# Patient Record
Sex: FEMALE
[De-identification: names, ages, dates, MRNs, and addresses within clinical notes are randomized per-mention and may not be internally consistent; named-entity substitution may affect disease eponyms.]

---

## 2017-07-19 ENCOUNTER — HOSPITAL ENCOUNTER (INPATIENT)
Dept: HOSPITAL 31 - C.EROB | Age: 27
LOS: 2 days | Discharge: HOME | DRG: 373 | End: 2017-07-21
Attending: OBSTETRICS & GYNECOLOGY | Admitting: OBSTETRICS & GYNECOLOGY
Payer: COMMERCIAL

## 2017-07-19 VITALS — BODY MASS INDEX: 24.2 KG/M2

## 2017-07-19 DIAGNOSIS — Z3A.38: ICD-10-CM

## 2017-07-19 LAB
ALBUMIN SERPL-MCNC: 3.2 G/DL (ref 3.5–5)
ALBUMIN/GLOB SERPL: 1 {RATIO} (ref 1–2.1)
ALT SERPL-CCNC: 27 U/L (ref 9–52)
AST SERPL-CCNC: 30 U/L (ref 14–36)
BASOPHILS # BLD AUTO: 0 K/UL (ref 0–0.2)
BASOPHILS NFR BLD: 0.4 % (ref 0–2)
BILIRUB UR-MCNC: NEGATIVE MG/DL
BUN SERPL-MCNC: 11 MG/DL (ref 7–17)
CALCIUM SERPL-MCNC: 8.8 MG/DL (ref 8.6–10.4)
EOSINOPHIL # BLD AUTO: 0.3 K/UL (ref 0–0.7)
EOSINOPHIL NFR BLD: 2.4 % (ref 0–4)
ERYTHROCYTE [DISTWIDTH] IN BLOOD BY AUTOMATED COUNT: 15.4 % (ref 11.5–14.5)
GFR NON-AFRICAN AMERICAN: > 60
GLUCOSE UR STRIP-MCNC: NORMAL MG/DL
HGB BLD-MCNC: 11.2 G/DL (ref 11–16)
LEUKOCYTE ESTERASE UR-ACNC: (no result) LEU/UL
LYMPHOCYTES # BLD AUTO: 3.2 K/UL (ref 1–4.3)
LYMPHOCYTES NFR BLD AUTO: 27.3 % (ref 20–40)
MCH RBC QN AUTO: 27.4 PG (ref 27–31)
MCHC RBC AUTO-ENTMCNC: 32.4 G/DL (ref 33–37)
MCV RBC AUTO: 84.6 FL (ref 81–99)
MONOCYTES # BLD: 0.9 K/UL (ref 0–0.8)
MONOCYTES NFR BLD: 7.5 % (ref 0–10)
NEUTROPHILS # BLD: 7.3 K/UL (ref 1.8–7)
NEUTROPHILS NFR BLD AUTO: 62.4 % (ref 50–75)
NRBC BLD AUTO-RTO: 0.1 % (ref 0–2)
PH UR STRIP: 7 [PH] (ref 5–8)
PLATELET # BLD: 263 K/UL (ref 130–400)
PMV BLD AUTO: 9.5 FL (ref 7.2–11.7)
PROT UR STRIP-MCNC: NEGATIVE MG/DL
RBC # BLD AUTO: 4.1 MIL/UL (ref 3.8–5.2)
RBC # UR STRIP: NEGATIVE /UL
SP GR UR STRIP: 1 (ref 1–1.03)
SQUAMOUS EPITHIAL: 1 /HPF (ref 0–5)
URINE NITRATE: NEGATIVE
UROBILINOGEN UR-MCNC: NORMAL MG/DL (ref 0.2–1)
WBC # BLD AUTO: 11.6 K/UL (ref 4.8–10.8)

## 2017-07-19 PROCEDURE — 0KQM0ZZ REPAIR PERINEUM MUSCLE, OPEN APPROACH: ICD-10-PCS | Performed by: OBSTETRICS & GYNECOLOGY

## 2017-07-19 RX ADMIN — Medication SCH TAB: at 10:16

## 2017-07-19 RX ADMIN — ACETAMINOPHEN AND CODEINE PHOSPHATE PRN EA: 300; 30 TABLET ORAL at 08:43

## 2017-07-19 RX ADMIN — ACETAMINOPHEN AND CODEINE PHOSPHATE PRN EA: 300; 30 TABLET ORAL at 14:38

## 2017-07-19 NOTE — OBDS
===================================

DELIVERY PERSONNEL

===================================

   

Delivery Doctor:  JUANA Bar MD

Circulator:  CORNELIO Rodríguez

   

===================================

MATERNAL INFORMATION

===================================

   

Delivery Anesthesia:  None

Medications in Delivery:  pitocin 20 units, methergine 0.2 mg

Estimated  Blood Loss (ml):  300

Placenta Cultured:  No

Maternal Complications:  None

Provider Comments:  Patient rapidly progressed to fully.Delivered a a viable male infant from LEIGH wit
h spontaneous delivery of the body.Cord clamped and cut.Cord blood collected.Baby taken to the warmer
.placenta spontaneously delivered.Uterus noted to be boggy.Im metehrgine 0.2 mg im given after which 
the uterus was noted to be firm.second degree perineal laceration and right and left labial laceratio
n repaired with 2-0 chromic.Fundus firm.patient stable

   

===================================

LABOR SUMMARY

===================================

   

EDC:  2017 00:00

No. Babies in Womb:  1

 Attempted:  No

Labor Anesthesia:  None

   

===================================

LABOR INFORMATION

===================================

   

Reason for Induction:  Not Applicable

Onset of Labor:  2017 18:00

Complete Dilatation:  2017 02:13

Other Ripening Agents:  n/a

Oxytocin:  N/A

Group B Beta Strep:  Negative

Antibiotics # of Doses:  0

Steroids Given:  None

Reason Steroids Not Administered:  Not Applicable

   

===================================

MEMBRANES

===================================

   

Membranes Rupture Method:  Spontaneous

Rupture of Membranes:  2017 02:14

Length of Rupture (hrs):  18.02

Amniotic Fluid Color:  Light Meconium

Amniotic Fluid Amount:  Moderate

Amniotic Fluid Odor:  Normal

   

===================================

STAGES OF LABOR

===================================

   

Stage 1 hrs:  8

Stage 1 min:  13

Stage 2 hrs:  18

Stage 2 min:  2

Stage 3 hrs:  -17

Stage 3 min:  -52

Total Time in Labor hrs:  8

Total Time in Labor min:  23

   

===================================

VAGINAL DELIVERY

===================================

   

Episiotomy:  None

Laceration Extension:  Second Degree

Laceration Type:  Perineal

Other Laceration:  right and left labial

Laceration Repair:  Yes

Laceration Repair Note:  second degree perineal laceration and right and left labial laceration repai
red with 2-0 chromic

Sponge Count Correct:  Yes; Vaginal Sweep Performed

Sharps Count Correct:  Yes

   

===================================

BABY A INFORMATION

===================================

   

Infant Delivery Date/Time:  2017 20:15

Method of Delivery:  Vaginal

Born in Route :  No

:  N/A

Forceps:  N/A

Vacuum Extraction:  N/A

Shoulder Dystocia :  No

   

===================================

SHOULDER DYSTOCIA BABY A

===================================

   

Infant Delivery Date/Time:  2017 20:15

   

===================================

PRESENTATION/POSITION BABY A

===================================

   

Presentation:  Cephalic

Cephalic Presentation:  Vertex

Vertex Position:  Right Occipital Anterior

Breech Presentation:  N/A

   

===================================

PLACENTA INFORMATION BABY A

===================================

   

Placenta Delivery Time :  2017 02:23

Placenta Method of Delivery:  Spontaneous

Placenta Status:  Delivered

   

===================================

APGAR SCORES BABY A

===================================

   

Heart Rate 1 min:  >100 bpm

Resp Effort 1 min:  Good Cry

Reflex Irritability 1 min:  Cough or Sneeze or Pulls Away

Muscle Tone 1 min:  Active Motion

Color 1 min:  Body Pink, Extremities Blue

APGAR SCORE 1 MIN:  9

Heart Rate 5 min:  >100 bpm

Resp Effort 5 min:  Good Cry

Reflex Irritability 5 min:  Cough or Sneeze or Pulls Away

Muscle Tone 5 min:  Active Motion

Color 5 min:  Body Pink, Extremities Blue

APGAR SCORE 5 MIN:  9

   

===================================

INFANT INFORMATION BABY A

===================================

   

Gestational Age at Delivery:  38.1

Gestational Status:  Term

Infant Outcome :  Liveborn

Infant Condition :  Stable

Infant Sex:  Male

   

===================================

IDENTIFICATION/MEDS BABY A

===================================

   

ID Band Number:  [id number]

ID Band Location:  Left Leg; Left Arm



Sensor Applied:  Yes

Sensor Number:  [id number]

Sensor Location :  Cord Clamp

Vitamin K Given :  Aquamephyton 1 mg IM

Erythromycin Given:  Given Both Eyes

   

===================================

WEIGHT/LENGTH BABY A

===================================

   

Infant Birthweight (gms):  2815

Infant Weight (lb):  6

Infant Weight (oz):  3

Infant Length Inches:  18.25

Infant Length cms:  46.4

   

===================================

CORD INFORMATION BABY A

===================================

   

No. Cord Vessels:  3

Nuchal Cord :  N/A

Nuchal Cord Other:  0

True Knot:  0

Cord Blood Taken:  Yes

Infant Suction:  None

   

===================================

ASSESSMENT BABY A

===================================

   

Infant Complications:  None

Physical Findings at Delivery:  Other

## 2017-07-19 NOTE — OBADHP
===========================

Datetime: 2017 01:25

===========================

   

Admit Comment, IP Provider:  chief complaint-contractions

   HPI 25 y/o  at 38.1 wga with c/o contractions .Patient denies vaginal bleeding or loss fof flu
id.Reports active fetal movement

   Prenatal course uncomplicated ; prenatal care in [facility]

   PMH denies

   PSH denies

   OBGYN HX ; nvdx1

   Social hx denies tobacco,alcohol or illicit drug use

   Exam

   see exam section

   A/P 25 y/o  at 38.1 wga by stated wallace with c/o contractions.Active labor.GBS negative

   -admit

   -see orders

Pelvic Type - PN:  Adequate

Extremities - PN:  Normal

Abdomen - PN:  Normal

Back - PN:  Normal

Lungs - PN:  Normal

Heart - PN:  Normal

Neurologic - PN:  Normal

General - PN:  Normal

Contraction Comments Provider:  irregular

Gestation - Est Wks by US:  38.1

IP Prenatal Hx Assessment:  The Prenatal History has been Reviewed and is Current

Vital Signs Provider:  Reviewed; Within Normal Limits

IP Chief Complaint:  Uterine contractions

FHR Category Provider Fetus A:  Category I

Dilatation, Provider:  4

Effacement, Provider:  90

Station, Provider:  -2

Genitourinary Exam:  Normal

DTRs - PN:  Normal

EGA AdmitDate IP:  38.1

IP Adm Impression:  Term, intrauterine pregnancy; Active labor

IP Admit Plan:  Admit to unit; Initiate labor protocol

## 2017-07-19 NOTE — OBHP
===========================

Datetime: 2017 01:25

===========================

   

IP Adm Impression:  Term, intrauterine pregnancy; Active labor

IP Admit Plan:  Admit to unit; Initiate labor protocol

Admit Comment, IP Provider:  chief complaint-contractions

   HPI 25 y/o  at 38.1 wga with c/o contractions .Patient denies vaginal bleeding or loss fof flu
id.Reports active fetal movement

   Prenatal course uncomplicated ; prenatal care in [facility]

   PMH denies

   PSH denies

   OBGYN HX ; nvdx1

   Social hx denies tobacco,alcohol or illicit drug use

   Exam

   see exam section

   A/P 25 y/o  at 38.1 wga by stated wallace with c/o contractions.Active labor.GBS negative

   -admit

   -see orders

Pelvic Type - PN:  Adequate

Extremities - PN:  Normal

Abdomen - PN:  Normal

Back - PN:  Normal

Lungs - PN:  Normal

Heart - PN:  Normal

Neurologic - PN:  Normal

General - PN:  Normal

Contraction Comments Provider:  irregular

Gestation - Est Wks by US:  38.1

IP Prenatal Hx Assessment:  The Prenatal History has been Reviewed and is Current

EGA AdmitDate IP:  38.1

Vital Signs Provider:  Reviewed; Within Normal Limits

IP Chief Complaint:  Uterine contractions

FHR Category Provider Fetus A:  Category I

Dilatation, Provider:  4

Effacement, Provider:  90

Station, Provider:  -2

Genitourinary Exam:  Normal

DTRs - PN:  Normal

## 2017-07-19 NOTE — OBPPN
===========================

Datetime: 2017 07:47

===========================

   

PP Pain Prov:  Within normal limits

PP Nausea Prov:  Denies

PP Flatus Prov:  Yes

PP Abdomen/Uterus Prov:  Normal

PP Lochia Prov:  Normal

PP Extremities Prov:  Normal

PP Comments Phys Exam Prov:  fudus below umblicus

   ext no edema,no calf ten

PP Impression Prov:  Normal postpartum progression

PP Plan Prov:  Continue present management

PP Progress Note Prov:  pt was seen at bed side, pain under control,no n/v, tolerating deit,voiding,m
in lochia, flatus+

   ppd#1 s/p 

   cont partum care

   pain management

   encourage ambulation

Vital Signs Provider PP:  Reviewed; Within Normal Limits

## 2017-07-20 LAB
BASOPHILS # BLD AUTO: 0 K/UL (ref 0–0.2)
BASOPHILS NFR BLD: 0.4 % (ref 0–2)
EOSINOPHIL # BLD AUTO: 0.3 K/UL (ref 0–0.7)
EOSINOPHIL NFR BLD: 2.7 % (ref 0–4)
ERYTHROCYTE [DISTWIDTH] IN BLOOD BY AUTOMATED COUNT: 15.6 % (ref 11.5–14.5)
HGB BLD-MCNC: 10.5 G/DL (ref 11–16)
LYMPHOCYTES # BLD AUTO: 2.8 K/UL (ref 1–4.3)
LYMPHOCYTES NFR BLD AUTO: 24.8 % (ref 20–40)
MCH RBC QN AUTO: 27.6 PG (ref 27–31)
MCHC RBC AUTO-ENTMCNC: 32.6 G/DL (ref 33–37)
MCV RBC AUTO: 84.7 FL (ref 81–99)
MONOCYTES # BLD: 0.8 K/UL (ref 0–0.8)
MONOCYTES NFR BLD: 7.1 % (ref 0–10)
NEUTROPHILS # BLD: 7.2 K/UL (ref 1.8–7)
NEUTROPHILS NFR BLD AUTO: 65 % (ref 50–75)
NRBC BLD AUTO-RTO: 0 % (ref 0–2)
PLATELET # BLD: 243 K/UL (ref 130–400)
PMV BLD AUTO: 8.7 FL (ref 7.2–11.7)
RBC # BLD AUTO: 3.79 MIL/UL (ref 3.8–5.2)
WBC # BLD AUTO: 11.2 K/UL (ref 4.8–10.8)

## 2017-07-20 RX ADMIN — ACETAMINOPHEN AND CODEINE PHOSPHATE PRN EA: 300; 30 TABLET ORAL at 10:01

## 2017-07-20 RX ADMIN — ACETAMINOPHEN AND CODEINE PHOSPHATE PRN EA: 300; 30 TABLET ORAL at 17:05

## 2017-07-20 RX ADMIN — Medication SCH TAB: at 10:02

## 2017-07-20 NOTE — OBPPN
===========================

Datetime: 07/20/2017 08:36

===========================

   

PP Pain Prov:  Within normal limits

PP Nausea Prov:  Denies

PP Flatus Prov:  Yes

PP Heart Prov:  Normal

PP Lungs Prov:  Normal

PP Abdomen/Uterus Prov:  Normal

PP Lochia Prov:  Normal

PP CVA Tenderness Prov:  Normal

PP Extremities Prov:  Normal

PP C/S Incision Prov:  Not Applicable

PP Breastfeeding Progress Prov:  Normal

PP Impression Prov:  Normal postpartum progression

PP Plan Prov:  Continue present management

PP Progress Note Prov:  S-patient denies any complaints.denies nausea, vomiting, headache, chest pain
, shortness of breath, numbness or tingling in hands and feet

   O-VSS afebrile

   Abdomen soft and nontener

   Fundus firm and below umbilcius

   extremities no calf tenderness

   A/P Patient s/p vaginal delivery ppd 1 doing well

   -continue routine postpartum care

Vital Signs Provider PP:  Reviewed; Within Normal Limits

## 2017-07-21 VITALS
OXYGEN SATURATION: 98 % | DIASTOLIC BLOOD PRESSURE: 71 MMHG | HEART RATE: 70 BPM | RESPIRATION RATE: 18 BRPM | SYSTOLIC BLOOD PRESSURE: 103 MMHG | TEMPERATURE: 97.8 F

## 2017-07-21 RX ADMIN — Medication SCH TAB: at 10:03

## 2017-07-21 NOTE — OBPPN
===========================

Datetime: 2017 19:49

===========================

   

PP Pain Prov:  Within normal limits

PP Nausea Prov:  Denies

PP Flatus Prov:  Yes

PP BM Prov:  Yes

PP Breasts Prov:  Normal

PP Heart Prov:  Normal

PP Lungs Prov:  Normal

PP Abdomen/Uterus Prov:  Normal

PP Lochia Prov:  Normal

PP Vulva/Perineum Prov:  Not Done

PP CVA Tenderness Prov:  Normal

PP Extremities Prov:  Normal

PP C/S Incision Prov:  Not Applicable

PP Breastfeeding Progress Prov:  Normal

PP Comments Phys Exam Prov:  Breasts: no cracked nipples

   Abdomen: soft, non distended. fundus firm, mobile, nontender, 2 FB below umbilicus. Minimal lochia
 rubra

      

      

   All other systems reviewed and are negative

PP Impression Prov:  Normal postpartum progression

PP Plan Prov:  Discharge

PP Progress Note Prov:  Patient was seen and evaluated at approximately 0735 hours: received in chair
. Breastfeeding exclusively. Denies nausea or vomiting; voiding and ambulating without difficulty.

      

   P.E.: as above. WD in NAD. Awake, alert, oriented to time, person and place. Pleasant and cooperat
sarkis

   - PPD#1 H/H 10.5/32.1 Rh(+)

      

      

   Assessment: PPD#2 26 y.o. P2, S/P . Afebrile, vital signs stable. Anemia - asymptomatic and hem
odynamically stable. Interested in IUD for contraception. Clinically stable.

      

   Plan:

   1) Discharge home

   2) See full discharge instrucitons

Vital Signs Provider PP:  Reviewed; Within Normal Limits

## 2017-07-21 NOTE — OBDCSUM
===========================

Datetime: 07/21/2017 08:18

===========================

   

Discharged to, Provider:  Home

Follow up at, Provider:  dr moffett

Disch Instr Activity:  Normal activity

Disch Instr Diet:  Regular

Discharge Diet restrict Prov:  none

Discharge Diagnosis, Provider:  Term Pregnancy Delivered

Discharge Time:  07/21/2017 10:00

Follow up in weeks, Provider:  6 weeks (carlos alberto sellers)

Disch Referrals:  None

Contraception discussed, Prov:  Yes

Disch Activity Restrictions:  No exercising; No lifting; No sexual activity; Nothing in vagina - Inte
rcourse, tampons, douche

Discharge Diagnosis Prov Other:  Anemia

   Contraception counseling

Contraception after Delivery:  IUD

## 2018-08-21 ENCOUNTER — HOSPITAL ENCOUNTER (OUTPATIENT)
Dept: HOSPITAL 31 - C.ER | Age: 28
Setting detail: OBSERVATION
LOS: 2 days | Discharge: HOME | End: 2018-08-23
Attending: SURGERY | Admitting: SURGERY
Payer: COMMERCIAL

## 2018-08-21 VITALS — BODY MASS INDEX: 21.7 KG/M2

## 2018-08-21 DIAGNOSIS — K80.10: Primary | ICD-10-CM

## 2018-08-21 LAB
ALBUMIN SERPL-MCNC: 4.4 G/DL (ref 3.5–5)
ALBUMIN/GLOB SERPL: 1.4 {RATIO} (ref 1–2.1)
ALT SERPL-CCNC: 89 U/L (ref 9–52)
AST SERPL-CCNC: 101 U/L (ref 14–36)
BASOPHILS # BLD AUTO: 0.1 K/UL (ref 0–0.2)
BASOPHILS NFR BLD: 1 % (ref 0–2)
BILIRUB UR-MCNC: NEGATIVE MG/DL
BUN SERPL-MCNC: 13 MG/DL (ref 7–17)
CALCIUM SERPL-MCNC: 8.9 MG/DL (ref 8.6–10.4)
EOSINOPHIL # BLD AUTO: 0.3 K/UL (ref 0–0.7)
EOSINOPHIL NFR BLD: 4 % (ref 0–4)
ERYTHROCYTE [DISTWIDTH] IN BLOOD BY AUTOMATED COUNT: 13.5 % (ref 11.5–14.5)
GFR NON-AFRICAN AMERICAN: > 60
GLUCOSE UR STRIP-MCNC: NORMAL MG/DL
HGB BLD-MCNC: 13.5 G/DL (ref 11–16)
LEUKOCYTE ESTERASE UR-ACNC: (no result) LEU/UL
LYMPHOCYTES # BLD AUTO: 3.1 K/UL (ref 1–4.3)
LYMPHOCYTES NFR BLD AUTO: 37.9 % (ref 20–40)
MCH RBC QN AUTO: 29.7 PG (ref 27–31)
MCHC RBC AUTO-ENTMCNC: 33.6 G/DL (ref 33–37)
MCV RBC AUTO: 88.3 FL (ref 81–99)
MONOCYTES # BLD: 0.5 K/UL (ref 0–0.8)
MONOCYTES NFR BLD: 6.5 % (ref 0–10)
NEUTROPHILS # BLD: 4.1 K/UL (ref 1.8–7)
NEUTROPHILS NFR BLD AUTO: 50.6 % (ref 50–75)
NRBC BLD AUTO-RTO: 0.2 % (ref 0–2)
PH UR STRIP: 5 [PH] (ref 5–8)
PLATELET # BLD: 294 K/UL (ref 130–400)
PMV BLD AUTO: 8 FL (ref 7.2–11.7)
PROT UR STRIP-MCNC: NEGATIVE MG/DL
RBC # BLD AUTO: 4.56 MIL/UL (ref 3.8–5.2)
RBC # UR STRIP: NEGATIVE /UL
SP GR UR STRIP: 1.02 (ref 1–1.03)
SQUAMOUS EPITHIAL: 7 /HPF (ref 0–5)
UROBILINOGEN UR-MCNC: 2 MG/DL (ref 0.2–1)
WBC # BLD AUTO: 8.1 K/UL (ref 4.8–10.8)

## 2018-08-21 PROCEDURE — 83690 ASSAY OF LIPASE: CPT

## 2018-08-21 PROCEDURE — 80053 COMPREHEN METABOLIC PANEL: CPT

## 2018-08-21 PROCEDURE — 76705 ECHO EXAM OF ABDOMEN: CPT

## 2018-08-21 PROCEDURE — 85025 COMPLETE CBC W/AUTO DIFF WBC: CPT

## 2018-08-21 PROCEDURE — 96374 THER/PROPH/DIAG INJ IV PUSH: CPT

## 2018-08-21 PROCEDURE — 84100 ASSAY OF PHOSPHORUS: CPT

## 2018-08-21 PROCEDURE — 81001 URINALYSIS AUTO W/SCOPE: CPT

## 2018-08-21 PROCEDURE — 88304 TISSUE EXAM BY PATHOLOGIST: CPT

## 2018-08-21 PROCEDURE — 47562 LAPAROSCOPIC CHOLECYSTECTOMY: CPT

## 2018-08-21 PROCEDURE — 74177 CT ABD & PELVIS W/CONTRAST: CPT

## 2018-08-21 PROCEDURE — 83735 ASSAY OF MAGNESIUM: CPT

## 2018-08-21 PROCEDURE — 96360 HYDRATION IV INFUSION INIT: CPT

## 2018-08-21 PROCEDURE — 96365 THER/PROPH/DIAG IV INF INIT: CPT

## 2018-08-21 PROCEDURE — 84703 CHORIONIC GONADOTROPIN ASSAY: CPT

## 2018-08-21 PROCEDURE — 36415 COLL VENOUS BLD VENIPUNCTURE: CPT

## 2018-08-21 PROCEDURE — 99285 EMERGENCY DEPT VISIT HI MDM: CPT

## 2018-08-21 NOTE — C.PDOC
History Of Present Illness


Patient comes to ER with complaints of abdominal pain with associated nausea, 

vomiting, diarrhea and weight loss. She states she is currently undergoing 

treatment for H. Pylori and is on her second day of antibiotics. Otherwise, no 

fever, chills, chest pain or shortness of breath. 


Time Seen by Provider: 08/21/18 22:00


Chief Complaint (Nursing): Abdominal Pain


History Per: Patient


History/Exam Limitations: no limitations


Onset/Duration Of Symptoms: Days (2)


Current Symptoms Are (Timing): Still Present


Context: Other


Severity: Moderate


Pain Scale Rating Of: 4


Location Of Pain/Discomfort: Diffuse


Radiation Of Pain To:: None


Quality Of Discomfort: "Pain"


Associated Symptoms: Nausea, Vomiting, Diarrhea.  denies: Fever, Chills, Chest 

Pain


Exacerbating Factors: None


Alleviating Factors: None


Last Bowel Movement: Today


Additional History Per: Patient


Abnormal Vaginal Bleeding: No





Past Medical History


Reviewed: Historical Data, Nursing Documentation, Vital Signs


Vital Signs: 


 Last Vital Signs











Temp  98.5 F   08/22/18 03:37


 


Pulse  74   08/22/18 03:37


 


Resp  18   08/22/18 03:37


 


BP  94/58 L  08/22/18 03:37


 


Pulse Ox  100   08/22/18 04:07














- Medical History


PMH: No Chronic Diseases


   Denies: Chronic Kidney Disease


Surgical History: No Surg Hx





- [facility] Procedures








DELIVERY OF PRODUCTS OF CONCEPTION, EXTERNAL APPROACH (07/19/17)


REPAIR PERINEUM MUSCLE, OPEN APPROACH (07/19/17)








Family History: States: No Known Family Hx





- Social History


Hx Tobacco Use: No


Hx Alcohol Use: No


Hx Substance Use: No





- Immunization History


Hx Tetanus Toxoid Vaccination: No


Hx Influenza Vaccination: No


Hx Pneumococcal Vaccination: No





Review Of Systems


Constitutional: Negative for: Fever, Chills


Cardiovascular: Negative for: Chest Pain


Respiratory: Negative for: Shortness of Breath


Gastrointestinal: Positive for: Nausea, Vomiting, Abdominal Pain, Diarrhea.  

Negative for: Constipation


Genitourinary: Negative for: Dysuria, Frequency, Hematuria


Musculoskeletal: Negative for: Back Pain


Skin: Negative for: Rash


Neurological: Negative for: Weakness


Psych: Negative for: Anxiety





Physical Exam





- Physical Exam


Appears: Non-toxic, No Acute Distress


Skin: Warm


Head: Normacephalic


Eye(s): bilateral: Normal Inspection


Oral Mucosa: Moist


Neck: Supple


Chest: Symmetrical


Cardiovascular: Rhythm Regular


Respiratory: No Rales, No Rhonchi, No Wheezing


Gastrointestinal/Abdominal: Soft, Tenderness (diffuse tenderness, more present 

on left upper quadrant), No Distention, No Guarding, No Rebound


Back: Normal Inspection


Extremity: Normal ROM, No Pedal Edema


Extremity: Bilateral: Atraumatic


Neurological/Psych: Oriented x3


Gait: Steady





ED Course And Treatment





- Laboratory Results


Result Diagrams: 


 08/21/18 21:54





 08/21/18 21:54


O2 Sat by Pulse Oximetry: 100 (RA)


Pulse Ox Interpretation: Normal





- CT Scan/US


  ** CT Abdomen/Pelvis


Other Rad Studies (CT/US): Radiology Report Reviewed


CT/US Interpretation: FINDINGS:  Lung bases: Unremarkable. No mass. No 

consolidation.  ABDOMEN:  Liver: Unremarkable. No mass.  Gallbladder and bile 

ducts: Gallbladder thickening and trace pericholecystic change suggest acute.  

cholecystitis. No calcified gallstone. No ductal dilation.  Pancreas: 

Unremarkable. No mass. No ductal dilation.  Spleen: Unremarkable. No 

splenomegaly.  Adrenals: Unremarkable. No mass.  Kidneys and ureters: 

Unremarkable. No solid mass. No hydronephrosis.  Stomach and bowel: 

Unremarkable. No obstruction. No mucosal thickening.  PELVIS:  Appendix: No 

findings to suggest acute appendicitis.  Bladder: Unremarkable. No mass.  

Reproductive: Unremarkable as visualized.  ABDOMEN and PELVIS:  Intraperitoneal 

space: Unremarkable. No free air. No significant fluid collection.  Bones/joints

: No acute fracture. No dislocation.  Soft tissues: Unremarkable.  Vasculature: 

Unremarkable. No abdominal aortic aneurysm.  Lymph nodes: Unremarkable. No 

enlarged lymph nodes.  Tubes, lines and devices: Intrauterine device is 

present.  IMPRESSION:  Gallbladder thickening and trace pericholecystic change 

suggest acute cholecystitis. No calcified.  gallstone.


Progress Note: Labs and UA ordered. Patient given Zofran, Protonix and IV 

fluids.





Disposition


Discussed With : Faye Call


Comment: accepted the pt on her service and took over the care at 4:16 AM


Doctor Will See Patient In The: ED


Counseled Patient/Family Regarding: Studies Performed, Diagnosis





- Disposition


Disposition: HOSPITALIZED


Disposition Time: 22:00


Condition: FAIR


Forms:  [facility] (English)





- POA


Present On Arrival: Poor Glycemic Control





- Clinical Impression


Clinical Impression: 


 Abdominal pain, Biliary colic, Acute cholecystitis








- Scribe Statement


The provider has reviewed the documentation as recorded by the Neyda Son


Provider Attestation: 


All medical record entries made by the Scribe were at my direction and 

personally dictated by me. I have reviewed the chart and agree that the record 

accurately reflects my personal performance of the history, physical exam, 

medical decision making, and the department course for this patient. I have 

also personally directed, reviewed, and agree with the discharge instructions 

and disposition.





Decision To Admit





- Pt Status Changed To:


Hospital Disposition Of: Inpatient





- Admit Certification


Admit to Inpatient:: After my assessment, the patient will require 

hospitalization for at least two midnights.  This is because of the severity of 

symptoms shown, intensity of services needed, and/or the medical risk in this 

patient being treated as an outpatient.





- InPatient:


Physician Admission Certification:: After my assessment, the patient will 

require hospitalization for at least two midnights.  This is because of the 

severity of symptoms shown, intensity of services needed, and/or the medical 

risk in this patient being treated as an outpatient.





- .


Bed Request Type: Regular


Admitting Physician: Faye Call


Patient Diagnosis: 


 Abdominal pain, Biliary colic, Acute cholecystitis

## 2018-08-22 VITALS — RESPIRATION RATE: 20 BRPM

## 2018-08-22 LAB
ALBUMIN SERPL-MCNC: 4.3 G/DL (ref 3.5–5)
ALBUMIN/GLOB SERPL: 1.5 {RATIO} (ref 1–2.1)
ALT SERPL-CCNC: 613 U/L (ref 9–52)
AST SERPL-CCNC: 1082 U/L (ref 14–36)
BASOPHILS # BLD AUTO: 0.1 K/UL (ref 0–0.2)
BASOPHILS NFR BLD: 1.1 % (ref 0–2)
BUN SERPL-MCNC: 7 MG/DL (ref 7–17)
CALCIUM SERPL-MCNC: 9 MG/DL (ref 8.6–10.4)
EOSINOPHIL # BLD AUTO: 0.1 K/UL (ref 0–0.7)
EOSINOPHIL NFR BLD: 1.3 % (ref 0–4)
ERYTHROCYTE [DISTWIDTH] IN BLOOD BY AUTOMATED COUNT: 13.7 % (ref 11.5–14.5)
GFR NON-AFRICAN AMERICAN: > 60
HGB BLD-MCNC: 13.1 G/DL (ref 11–16)
LYMPHOCYTES # BLD AUTO: 1.6 K/UL (ref 1–4.3)
LYMPHOCYTES NFR BLD AUTO: 27.7 % (ref 20–40)
MCH RBC QN AUTO: 30.2 PG (ref 27–31)
MCHC RBC AUTO-ENTMCNC: 34.5 G/DL (ref 33–37)
MCV RBC AUTO: 87.5 FL (ref 81–99)
MONOCYTES # BLD: 0.6 K/UL (ref 0–0.8)
MONOCYTES NFR BLD: 10.9 % (ref 0–10)
NEUTROPHILS # BLD: 3.5 K/UL (ref 1.8–7)
NEUTROPHILS NFR BLD AUTO: 59 % (ref 50–75)
NRBC BLD AUTO-RTO: 0.1 % (ref 0–2)
PLATELET # BLD: 298 K/UL (ref 130–400)
PMV BLD AUTO: 7.9 FL (ref 7.2–11.7)
RBC # BLD AUTO: 4.34 MIL/UL (ref 3.8–5.2)
WBC # BLD AUTO: 5.9 K/UL (ref 4.8–10.8)

## 2018-08-22 RX ADMIN — HYDROMORPHONE HYDROCHLORIDE PRN MG: 1 INJECTION, SOLUTION INTRAMUSCULAR; INTRAVENOUS; SUBCUTANEOUS at 14:50

## 2018-08-22 RX ADMIN — PANTOPRAZOLE SODIUM SCH MG: 40 TABLET, DELAYED RELEASE ORAL at 18:05

## 2018-08-22 RX ADMIN — HYDROMORPHONE HYDROCHLORIDE PRN MG: 1 INJECTION, SOLUTION INTRAMUSCULAR; INTRAVENOUS; SUBCUTANEOUS at 15:05

## 2018-08-22 RX ADMIN — PANTOPRAZOLE SODIUM SCH MG: 40 TABLET, DELAYED RELEASE ORAL at 10:59

## 2018-08-22 NOTE — CT
Date of service: 



08/22/2018



PROCEDURE:  CT Abdomen and Pelvis without intravenous contrast



HISTORY:

Abdominal pain 



COMPARISON:

CT abdomen and pelvis dated 05/08/2016



TECHNIQUE:

Multiple contiguous axial images were performed through the abdomen 

and pelvis with the use of intravenous contrast.  Subsequently, 

sagittal and coronal reformatted images were obtained. 



Radiation dose:



Total exam DLP = 220 mGy-cm.



This CT exam was performed using one or more of the following dose 

reduction techniques: Automated exposure control, adjustment of the 

mA and/or kV according to patient size, and/or use of iterative 

reconstruction technique.



FINDINGS:



LOWER THORAX:

Unremarkable. 



LIVER:

Unremarkable. No gross lesion or ductal dilatation.  



GALLBLADDER AND BILE DUCTS:

Gallbladder wall thickening and trace pericholecystic fluid 

suggestive for acute cholecystitis. No gross calcified gallstone 

identified. No ductal dilatation. 



PANCREAS:

Unremarkable. No gross lesion or ductal dilatation.



SPLEEN:

Unremarkable. 



ADRENALS:

Unremarkable. No mass. 



KIDNEYS AND URETERS:

Unremarkable. No hydronephrosis. No solid mass. 



VASCULATURE:

Unremarkable. No aortic aneurysm. 



BOWEL:

Unremarkable. No obstruction. No gross mural thickening. 



APPENDIX:

No findings to suggest acute appendicitis. 



PERITONEUM:

Unremarkable. No free fluid. No free air. 



LYMPH NODES:

Unremarkable. No enlarged lymph nodes. 



BLADDER:

Unremarkable. 



REPRODUCTIVE:

Unremarkable. 



BONES:

No acute fracture. 



OTHER FINDINGS:

Intrauterine device is present.



IMPRESSION:

Gallbladder wall thickening and trace pericholecystic fluid 

suggestive for possible acute cholecystitis. No gross calcified 

gallstone identified.  Correlation with right upper quadrant 

abdominal ultrasound may helpful further evaluation if clinically 

indicated.



Additional findings as above. 



These findings were preliminarily reported at 4 a.m. on 08/22/2018 by 

Dr. Gigi De Jesus from [facility].

## 2018-08-22 NOTE — PCM.SURG1
Surgeon's Initial Post Op Note





- Surgeon's Notes


Surgeon: CASI Call


Assistant: AMY Smith PGY3


Type of Anesthesia: General Endo


Anesthesia Administered By: Kassi


Pre-Operative Diagnosis: cholelithiasis


Operative Findings: multiple gallstones


Post-Operative Diagnosis: cholelithiasis


Operation Performed: laparoscopic cholecystectomy


Specimen/Specimens Removed: gallbladder


Estimated Blood Loss: EBL {In ML}: 10


Blood Products Given: N/A


Drains Used: No Drains


Post-Op Condition: Good


Date of Surgery/Procedure: 08/22/18


Time of Surgery/Procedure: 14:00

## 2018-08-22 NOTE — US
Right upper quadrant abdominal ultrasound 



History: Cholelithiasis. 



Comparison: CT of the abdomen and pelvis dated 08/22/2018 



Technique: Real-time sonography was performed through the right upper 

quadrant of the abdomen. 



Findings 



Liver: 14.5 centimeters in length.  Normal echogenicity. 



Gallbladder: Gallbladder appears packed with gallstones with a 

somewhat masslike consolidation suggestive for prominent 

cholelithiasis. Gallbladder wall thickening measuring up to 5 

millimeters.  Associated gallbladder wall edema. 



Common bile duct measures 3 millimeters. 



Limited visualization of the pancreas. 



Visualized aorta and IVC are preserved. 



Right kidney: 8.7 x 3.5 x 4.8 centimeters. No calculi or 

hydronephrosis. 



Impression: 



Prominent cholelithiasis with a somewhat masslike consolidation with 

thickening of the gallbladder wall concerning for acute 

cholecystitis. Clinical correlation. 



These findings were preliminarily reported at 7:12 a.m. on 08/22/2018 

by Dr. Wood Cedillo from [facility].

## 2018-08-22 NOTE — CP.PCM.HP
History of Present Illness





- History of Present Illness


History of Present Illness: 





History and Physical for Dr. Call


CC: abdominal pain


Late Entry, patient seen and examined in the ED at 0445





Pt is a 27F with PMH of newly diagnosed gastritis with H pylori at [facility] who initiated triple therapy as an outpatient 2 days ago who 

presents to the ED with upper abdominal pain for acute worsened the day before 

with nausea and vomiting 2-3 times non-bloody, non-bilious emesis. Patient 

states that she has had this epigastric pain for 1 month off and on with non-

bloody diarrhea, but that the pain acute worsened yesterday, so she came the 

ER. Pain radiates to her bilateral shoulders. Denies any fevers, chills, melena

, hematochezia, or any other symptoms. Pain improved with IV protonix





PMH: Gastritis, H. Pylori


PSH: denies


ALL: ibuprofen--rash


Social: Denies any substance use





Present on Admission





- Present on Admission


Any Indicators Present on Admission: No





Review of Systems





- Review of Systems


All systems: reviewed and no additional remarkable complaints except (as per HPI

)





Past Patient History





- Infectious Disease


Hx of Infectious Diseases: None





- Past Medical History & Family History


Past Medical History?: Yes


Past Family History: Reviewed and not pertinent





- Past Social History


Smoking Status: Never Smoked


Alcohol: None


Drugs: Denies





- CARDIAC


Hx Cardiac Disorders: No





- PULMONARY


Hx Respiratory Disorders: No





- NEUROLOGICAL


Other/Comment: epilepsy





- HEENT


Hx HEENT Problems: No





- RENAL


Hx Chronic Kidney Disease: No





- ENDOCRINE/METABOLIC


Hx Endocrine Disorders: No





- HEMATOLOGICAL/ONCOLOGICAL


Hx Blood Disorders: No





- INTEGUMENTARY


Hx Dermatological Problems: No





- MUSCULOSKELETAL/RHEUMATOLOGICAL


Hx Musculoskeletal Disorders: No





- GASTROINTESTINAL


Hx Gastrointestinal Disorders: No





- GENITOURINARY/GYNECOLOGICAL


Hx Genitourinary Disorders: No





- PSYCHIATRIC


Hx Substance Use: No





- SURGICAL HISTORY


Hx Surgeries: No





- ANESTHESIA


Hx Anesthesia: No





Meds


Allergies/Adverse Reactions: 


 Allergies











Allergy/AdvReac Type Severity Reaction Status Date / Time


 


ibuprofen Allergy Intermediate SKIN RASHES Verified 08/21/18 20:57














Physical Exam





- Constitutional


Appears: Well, Non-toxic, No Acute Distress





- Head Exam


Head Exam: ATRAUMATIC, NORMOCEPHALIC





- Eye Exam


Eye Exam: Normal appearance.  absent: Conjunctival injection, Scleral icterus





- ENT Exam


ENT Exam: Mucous Membranes Moist, Normal Oropharynx





- Respiratory Exam


Respiratory Exam: NORMAL BREATHING PATTERN.  absent: Accessory Muscle Use, 

Respiratory Distress





- Cardiovascular Exam


Cardiovascular Exam: RRR





- GI/Abdominal Exam


GI & Abdominal Exam: Distended, Soft, Tenderness (epigastrium).  absent: Rebound


Additional comments: 





negative rogers's, no RUQ tenderness





- Extremities Exam


Extremities exam: Positive for: pedal pulses present.  Negative for: calf 

tenderness, pedal edema





- Back Exam


Back exam: absent: CVA tenderness (L), CVA tenderness (R)





- Neurological Exam


Neurological exam: Alert, Oriented x3





- Psychiatric Exam


Psychiatric exam: Normal Affect, Normal Mood





- Skin


Skin Exam: Dry, Intact, Normal Color, Warm





Results





- Vital Signs


Recent Vital Signs: 





 Last Vital Signs











Temp  99.0 F   08/22/18 07:30


 


Pulse  67   08/22/18 07:30


 


Resp  18   08/22/18 07:30


 


BP  97/60 L  08/22/18 07:30


 


Pulse Ox  100   08/22/18 07:30














- Labs


Result Diagrams: 


 08/22/18 06:40





 08/22/18 06:40


Labs: 





 Laboratory Results - last 24 hr











  08/21/18 08/21/18 08/21/18





  21:54 21:54 21:54


 


WBC   8.1 


 


RBC   4.56 


 


Hgb   13.5 


 


Hct   40.3 


 


MCV   88.3 


 


MCH   29.7 


 


MCHC   33.6 


 


RDW   13.5 


 


Plt Count   294 


 


MPV   8.0 


 


Neut % (Auto)   50.6 


 


Lymph % (Auto)   37.9 


 


Mono % (Auto)   6.5 


 


Eos % (Auto)   4.0 


 


Baso % (Auto)   1.0 


 


Neut # (Auto)   4.1 


 


Lymph # (Auto)   3.1 


 


Mono # (Auto)   0.5 


 


Eos # (Auto)   0.3 


 


Baso # (Auto)   0.1 


 


Sodium    143


 


Potassium    3.8


 


Chloride    105


 


Carbon Dioxide    22


 


Anion Gap    20


 


BUN    13


 


Creatinine    0.6 L


 


Est GFR ( Amer)    > 60


 


Est GFR (Non-Af Amer)    > 60


 


Random Glucose    110 H


 


Calcium    8.9


 


Phosphorus   


 


Magnesium   


 


Total Bilirubin    0.4


 


AST    101 H


 


ALT    89 H D


 


Alkaline Phosphatase    142 H


 


Total Protein    7.5


 


Albumin    4.4


 


Globulin    3.1


 


Albumin/Globulin Ratio    1.4


 


Lipase   


 


Urine Color  Yellow  


 


Urine Clarity  Clear  


 


Urine pH  5.0  


 


Ur Specific Gravity  1.018  


 


Urine Protein  Negative  


 


Urine Glucose (UA)  Normal  


 


Urine Ketones  Negative  


 


Urine Blood  Negative  


 


Urine Nitrate  Negative  


 


Urine Bilirubin  Negative  


 


Urine Urobilinogen  2.0 H  


 


Ur Leukocyte Esterase  Trace  


 


Urine WBC (Auto)  1  


 


Urine RBC (Auto)  < 1  


 


Ur Squamous Epith Cells  7 H  














  08/21/18 08/22/18 08/22/18





  22:28 06:40 06:40


 


WBC    5.9


 


RBC    4.34


 


Hgb    13.1


 


Hct    38.0


 


MCV    87.5


 


MCH    30.2


 


MCHC    34.5


 


RDW    13.7


 


Plt Count    298


 


MPV    7.9


 


Neut % (Auto)    59.0


 


Lymph % (Auto)    27.7


 


Mono % (Auto)    10.9 H


 


Eos % (Auto)    1.3


 


Baso % (Auto)    1.1


 


Neut # (Auto)    3.5


 


Lymph # (Auto)    1.6


 


Mono # (Auto)    0.6


 


Eos # (Auto)    0.1


 


Baso # (Auto)    0.1


 


Sodium   


 


Potassium   


 


Chloride   


 


Carbon Dioxide   


 


Anion Gap   


 


BUN   


 


Creatinine   


 


Est GFR ( Amer)   


 


Est GFR (Non-Af Amer)   


 


Random Glucose   


 


Calcium   


 


Phosphorus   4.4 


 


Magnesium   1.9 


 


Total Bilirubin   


 


AST   


 


ALT   


 


Alkaline Phosphatase   


 


Total Protein   


 


Albumin   


 


Globulin   


 


Albumin/Globulin Ratio   


 


Lipase  80  


 


Urine Color   


 


Urine Clarity   


 


Urine pH   


 


Ur Specific Gravity   


 


Urine Protein   


 


Urine Glucose (UA)   


 


Urine Ketones   


 


Urine Blood   


 


Urine Nitrate   


 


Urine Bilirubin   


 


Urine Urobilinogen   


 


Ur Leukocyte Esterase   


 


Urine WBC (Auto)   


 


Urine RBC (Auto)   


 


Ur Squamous Epith Cells   














  08/22/18





  06:40


 


WBC 


 


RBC 


 


Hgb 


 


Hct 


 


MCV 


 


MCH 


 


MCHC 


 


RDW 


 


Plt Count 


 


MPV 


 


Neut % (Auto) 


 


Lymph % (Auto) 


 


Mono % (Auto) 


 


Eos % (Auto) 


 


Baso % (Auto) 


 


Neut # (Auto) 


 


Lymph # (Auto) 


 


Mono # (Auto) 


 


Eos # (Auto) 


 


Baso # (Auto) 


 


Sodium  142


 


Potassium  3.8


 


Chloride  106


 


Carbon Dioxide  21 L


 


Anion Gap  19


 


BUN  7


 


Creatinine  0.6 L


 


Est GFR ( Amer)  > 60


 


Est GFR (Non-Af Amer)  > 60


 


Random Glucose  98


 


Calcium  9.0


 


Phosphorus 


 


Magnesium 


 


Total Bilirubin  1.6 H


 


AST  1082 H


 


ALT  613 H D


 


Alkaline Phosphatase  203 H D


 


Total Protein  7.2


 


Albumin  4.3


 


Globulin  2.9


 


Albumin/Globulin Ratio  1.5


 


Lipase 


 


Urine Color 


 


Urine Clarity 


 


Urine pH 


 


Ur Specific Gravity 


 


Urine Protein 


 


Urine Glucose (UA) 


 


Urine Ketones 


 


Urine Blood 


 


Urine Nitrate 


 


Urine Bilirubin 


 


Urine Urobilinogen 


 


Ur Leukocyte Esterase 


 


Urine WBC (Auto) 


 


Urine RBC (Auto) 


 


Ur Squamous Epith Cells 














- Imaging and Cardiology


  ** CT scan - abdomen


Status: Image reviewed by me, Report reviewed by me


Additional comment: 





possible trace pericholecystic fluid, mildly thickened gallbladder wall





  ** US - abdomen


Status: Image reviewed by me, Report reviewed by me


Additional comment: 





gallbladder with multiple stones & thickened wall





Assessment & Plan


(1) Cholelithiasis


Status: Acute   Priority: High   





(2) Gastritis, Helicobacter pylori


Status: Acute   Priority: Medium   





(3) Abdominal pain


Status: Acute   Priority: High   





- Assessment and Plan (Free Text)


Assessment: 





27F with PMH of current H. Pylori being treated with antibiotics and PPI and 

cholelithiasis vs acute cholecystitis vs gastritis flare


Plan: 


F/U official reports of the CT and the ultrasound


Trend CBC and CMP


Monitor abdominal exam and vitals


Admit to med/surgery as observation


IVF


IV antibiotics


PRN pain and nausea medication


Continue outpatient H. Pylori therapy


Re-evaluate patient's clinical picture and labs to evaluate for acute 

cholecystitis vs symptomatic cholelithiasis vs gastritis--Plans for 

conservative management vs OR pending clinical picture





Further recommendations per Dr. Jakub Chapin, PGY2





- Date & Time


Date: 08/22/18


Time: 04:45

## 2018-08-23 VITALS — TEMPERATURE: 98.7 F | DIASTOLIC BLOOD PRESSURE: 66 MMHG | SYSTOLIC BLOOD PRESSURE: 101 MMHG | HEART RATE: 73 BPM

## 2018-08-23 VITALS — OXYGEN SATURATION: 99 %

## 2018-08-23 LAB
ALBUMIN SERPL-MCNC: 4 G/DL (ref 3.5–5)
ALBUMIN/GLOB SERPL: 1.6 {RATIO} (ref 1–2.1)
ALT SERPL-CCNC: 550 U/L (ref 9–52)
AST SERPL-CCNC: 340 U/L (ref 14–36)
BASOPHILS # BLD AUTO: 0 K/UL (ref 0–0.2)
BASOPHILS NFR BLD: 0.5 % (ref 0–2)
BUN SERPL-MCNC: 5 MG/DL (ref 7–17)
CALCIUM SERPL-MCNC: 8.9 MG/DL (ref 8.6–10.4)
EOSINOPHIL # BLD AUTO: 0.2 K/UL (ref 0–0.7)
EOSINOPHIL NFR BLD: 3 % (ref 0–4)
ERYTHROCYTE [DISTWIDTH] IN BLOOD BY AUTOMATED COUNT: 13.7 % (ref 11.5–14.5)
GFR NON-AFRICAN AMERICAN: > 60
HGB BLD-MCNC: 12.7 G/DL (ref 11–16)
LYMPHOCYTES # BLD AUTO: 1.7 K/UL (ref 1–4.3)
LYMPHOCYTES NFR BLD AUTO: 20.6 % (ref 20–40)
MCH RBC QN AUTO: 30.3 PG (ref 27–31)
MCHC RBC AUTO-ENTMCNC: 34.6 G/DL (ref 33–37)
MCV RBC AUTO: 87.8 FL (ref 81–99)
MONOCYTES # BLD: 0.4 K/UL (ref 0–0.8)
MONOCYTES NFR BLD: 5.4 % (ref 0–10)
NEUTROPHILS # BLD: 5.8 K/UL (ref 1.8–7)
NEUTROPHILS NFR BLD AUTO: 70.5 % (ref 50–75)
NRBC BLD AUTO-RTO: 0 % (ref 0–2)
PLATELET # BLD: 268 K/UL (ref 130–400)
PMV BLD AUTO: 8.1 FL (ref 7.2–11.7)
RBC # BLD AUTO: 4.18 MIL/UL (ref 3.8–5.2)
WBC # BLD AUTO: 8.2 K/UL (ref 4.8–10.8)

## 2018-08-23 RX ADMIN — PANTOPRAZOLE SODIUM SCH MG: 40 TABLET, DELAYED RELEASE ORAL at 09:34

## 2018-08-23 NOTE — CP.PCM.DIS
Provider





- Provider


Date of Admission: 


08/22/18 04:15





Attending physician: 


Faye Call MD








Hospital Course





- Lab Results


Lab Results: 


 Most Recent Lab Values











WBC  5.9 K/uL (4.8-10.8)   08/22/18  06:40    


 


RBC  4.34 Mil/uL (3.80-5.20)   08/22/18  06:40    


 


Hgb  13.1 g/dL (11.0-16.0)   08/22/18  06:40    


 


Hct  38.0 % (34.0-47.0)   08/22/18  06:40    


 


MCV  87.5 fL (81.0-99.0)   08/22/18  06:40    


 


MCH  30.2 pg (27.0-31.0)   08/22/18  06:40    


 


MCHC  34.5 g/dL (33.0-37.0)   08/22/18  06:40    


 


RDW  13.7 % (11.5-14.5)   08/22/18  06:40    


 


Plt Count  298 K/uL (130-400)   08/22/18  06:40    


 


MPV  7.9 fL (7.2-11.7)   08/22/18  06:40    


 


Neut % (Auto)  59.0 % (50.0-75.0)   08/22/18  06:40    


 


Lymph % (Auto)  27.7 % (20.0-40.0)   08/22/18  06:40    


 


Mono % (Auto)  10.9 % (0.0-10.0)  H  08/22/18  06:40    


 


Eos % (Auto)  1.3 % (0.0-4.0)   08/22/18  06:40    


 


Baso % (Auto)  1.1 % (0.0-2.0)   08/22/18  06:40    


 


Neut # (Auto)  3.5 K/uL (1.8-7.0)   08/22/18  06:40    


 


Lymph # (Auto)  1.6 K/uL (1.0-4.3)   08/22/18  06:40    


 


Mono # (Auto)  0.6 K/uL (0.0-0.8)   08/22/18  06:40    


 


Eos # (Auto)  0.1 K/uL (0.0-0.7)   08/22/18  06:40    


 


Baso # (Auto)  0.1 K/uL (0.0-0.2)   08/22/18  06:40    


 


Sodium  142 mmol/L (132-148)   08/22/18  06:40    


 


Potassium  3.8 mmol/L (3.6-5.2)   08/22/18  06:40    


 


Chloride  106 mmol/L ()   08/22/18  06:40    


 


Carbon Dioxide  21 mmol/L (22-30)  L  08/22/18  06:40    


 


Anion Gap  19  (10-20)   08/22/18  06:40    


 


BUN  7 mg/dL (7-17)   08/22/18  06:40    


 


Creatinine  0.6 mg/dL (0.7-1.2)  L  08/22/18  06:40    


 


Est GFR ( Amer)  > 60   08/22/18  06:40    


 


Est GFR (Non-Af Amer)  > 60   08/22/18  06:40    


 


Random Glucose  98 mg/dL ()   08/22/18  06:40    


 


Calcium  9.0 mg/dl (8.6-10.4)   08/22/18  06:40    


 


Phosphorus  4.4 mg/dL (2.5-4.5)   08/22/18  06:40    


 


Magnesium  1.9 mg/dL (1.6-2.3)   08/22/18  06:40    


 


Total Bilirubin  1.6 mg/dL (0.2-1.3)  H  08/22/18  06:40    


 


AST  1082 U/L (14-36)  H  08/22/18  06:40    


 


ALT  613 U/L (9-52)  H D 08/22/18  06:40    


 


Alkaline Phosphatase  203 U/L ()  H D 08/22/18  06:40    


 


Total Protein  7.2 g/dL (6.3-8.3)   08/22/18  06:40    


 


Albumin  4.3 g/dL (3.5-5.0)   08/22/18  06:40    


 


Globulin  2.9 gm/dL (2.2-3.9)   08/22/18  06:40    


 


Albumin/Globulin Ratio  1.5  (1.0-2.1)   08/22/18  06:40    


 


Lipase  80 U/L ()   08/21/18  22:28    


 


Urine Color  Yellow  (YELLOW)   08/21/18  21:54    


 


Urine Clarity  Clear  (Clear)   08/21/18  21:54    


 


Urine pH  5.0  (5.0-8.0)   08/21/18  21:54    


 


Ur Specific Gravity  1.018  (1.003-1.030)   08/21/18  21:54    


 


Urine Protein  Negative mg/dL (NEGATIVE)   08/21/18  21:54    


 


Urine Glucose (UA)  Normal mg/dL (Normal)   08/21/18  21:54    


 


Urine Ketones  Negative mg/dL (NEGATIVE)   08/21/18  21:54    


 


Urine Blood  Negative  (NEGATIVE)   08/21/18  21:54    


 


Urine Nitrate  Negative  (NEGATIVE)   08/21/18  21:54    


 


Urine Bilirubin  Negative  (NEGATIVE)   08/21/18  21:54    


 


Urine Urobilinogen  2.0 mg/dL (0.2-1.0)  H  08/21/18  21:54    


 


Ur Leukocyte Esterase  Trace Letha/uL (Negative)   08/21/18  21:54    


 


Urine WBC (Auto)  1 /hpf (0-5)   08/21/18  21:54    


 


Urine RBC (Auto)  < 1 /hpf (0-3)   08/21/18  21:54    


 


Ur Squamous Epith Cells  7 /hpf (0-5)  H  08/21/18  21:54    


 


Urine HCG, Qual  Negative  (NEGATIVE)   08/22/18  12:29    














Discharge Exam





- Head Exam


Head Exam: ATRAUMATIC, NORMOCEPHALIC





Discharge Plan





- Follow Up Plan


Condition: FAIR


Disposition: HOME/ ROUTINE

## 2019-02-27 ENCOUNTER — HOSPITAL ENCOUNTER (OUTPATIENT)
Dept: HOSPITAL 31 - C.ENDO | Age: 29
Discharge: HOME | End: 2019-02-27
Attending: INTERNAL MEDICINE
Payer: COMMERCIAL

## 2019-02-27 VITALS — RESPIRATION RATE: 17 BRPM | OXYGEN SATURATION: 98 %

## 2019-02-27 VITALS — DIASTOLIC BLOOD PRESSURE: 53 MMHG | SYSTOLIC BLOOD PRESSURE: 97 MMHG | HEART RATE: 71 BPM

## 2019-02-27 VITALS — TEMPERATURE: 97.8 F

## 2019-02-27 VITALS — BODY MASS INDEX: 21.7 KG/M2

## 2019-02-27 DIAGNOSIS — K21.0: Primary | ICD-10-CM

## 2019-02-27 DIAGNOSIS — R10.13: ICD-10-CM

## 2019-02-27 DIAGNOSIS — B96.81: ICD-10-CM

## 2019-02-27 DIAGNOSIS — K44.9: ICD-10-CM

## 2019-02-27 DIAGNOSIS — D72.820: ICD-10-CM

## 2019-02-27 DIAGNOSIS — K29.70: ICD-10-CM

## 2019-02-27 PROCEDURE — 88305 TISSUE EXAM BY PATHOLOGIST: CPT

## 2019-02-27 PROCEDURE — 84703 CHORIONIC GONADOTROPIN ASSAY: CPT

## 2019-02-27 PROCEDURE — 88342 IMHCHEM/IMCYTCHM 1ST ANTB: CPT

## 2019-02-27 PROCEDURE — 88313 SPECIAL STAINS GROUP 2: CPT

## 2019-02-27 PROCEDURE — 43239 EGD BIOPSY SINGLE/MULTIPLE: CPT

## 2019-02-27 PROCEDURE — 88312 SPECIAL STAINS GROUP 1: CPT

## 2019-02-27 NOTE — CP.SDSHP
Same Day Surgery H & P





- History


Proposed Procedure: egd


Pre-Op Diagnosis: persistent H pylori.  heartburn.  epigastric pain





- Previous Medical/Surgical History


Misc: Other (H pylori infection treated x2)


Previous Surgical History: Lap No





- Allergies


Allergies: 


Allergies





ibuprofen Allergy (Intermediate, Verified 08/21/18 20:57)


   SKIN RASHES











- Physical Exam


Vital Signs: 


                                   Vital Signs











  02/27/19 02/27/19





  07:16 09:32


 


Temperature 98.9 F 


 


Pulse Rate 88 66


 


Respiratory 18 4 L





Rate  


 


Blood Pressure 107/75 111/68


 


O2 Sat by Pulse 100 100





Oximetry  











Mental Status: Alert & Oriented x3


Neuro: WNL


Heart: WNL


Lungs: WNL


GI: WNL





- Impression


Impression: epigastric pain.  heartburn.  H pylori gastritis


Pt. Evaluated Today:Candidate for Anesthesia & Procedure: Yes





- Date & Time


Date: 02/27/19


Time: 09:39





Short Stay Discharge





- Short Stay Discharge


Admitting Diagnosis/Reason for Visit: EPIGASTRIC PAIN / HEARTBURN / HELICOBACTER

PYLORI


Disposition: HOME/ ROUTINE